# Patient Record
Sex: MALE | NOT HISPANIC OR LATINO | URBAN - METROPOLITAN AREA
[De-identification: names, ages, dates, MRNs, and addresses within clinical notes are randomized per-mention and may not be internally consistent; named-entity substitution may affect disease eponyms.]

---

## 2018-07-06 ENCOUNTER — OFFICE VISIT (OUTPATIENT)
Dept: URBAN - METROPOLITAN AREA CLINIC 76 | Facility: CLINIC | Age: 73
End: 2018-07-06
Payer: MEDICARE

## 2018-07-06 PROCEDURE — 92134 CPTRZ OPH DX IMG PST SGM RTA: CPT | Performed by: OPHTHALMOLOGY

## 2018-07-06 PROCEDURE — 99213 OFFICE O/P EST LOW 20 MIN: CPT | Performed by: OPHTHALMOLOGY

## 2018-07-06 ASSESSMENT — INTRAOCULAR PRESSURE
OS: 10
OD: 12

## 2018-07-06 NOTE — IMPRESSION/PLAN
Impression: Type 2 diab w moderate nonprlf diab rtnop w macular edema, bilateral: G95.7699.
s/p Ozd OD last injection was Dec 2017 Plan: OCT ordered and performed today, Discussed diagnosis in detail with patient. Discussed treatment options with patient. Discussed injection Vs FA, Recommend Lab work. Labs ordered and sent W/ pt today, Recommend re-eval in mth W/ possiblle Ozd injections. Discussed risks of progression. Emphasized blood sugar control. Pt agrees W/ plan.

## 2018-07-30 ENCOUNTER — OFFICE VISIT (OUTPATIENT)
Dept: URBAN - METROPOLITAN AREA CLINIC 76 | Facility: CLINIC | Age: 73
End: 2018-07-30
Payer: MEDICARE

## 2018-07-30 PROCEDURE — 92134 CPTRZ OPH DX IMG PST SGM RTA: CPT | Performed by: OPHTHALMOLOGY

## 2018-07-30 PROCEDURE — 99213 OFFICE O/P EST LOW 20 MIN: CPT | Performed by: OPHTHALMOLOGY

## 2018-07-30 PROCEDURE — 67028 INJECTION EYE DRUG: CPT | Performed by: OPHTHALMOLOGY

## 2018-07-30 ASSESSMENT — INTRAOCULAR PRESSURE
OS: 12
OD: 13

## 2018-07-30 NOTE — IMPRESSION/PLAN
Impression: Type 2 diab w moderate nonprlf diab rtnop w macular edema, bilateral: V89.6004.
s/p Ozd OD last injection was Dec 2017 Plan: OCT ordered and performed today, Discussed diagnosis in detail with patient. Discussed treatment options with patient. Discussed with Patient lab work results show high cholesterol, Carotid artery exam done in April and was normal. Discussed with Patient I recommend a round of injections for macula edema in both eyes and will recheck exam. Patient understands and agrees to proceed with injection. Ozurdex in both eyes today.

## 2018-08-17 ENCOUNTER — OFFICE VISIT (OUTPATIENT)
Dept: URBAN - METROPOLITAN AREA CLINIC 76 | Facility: CLINIC | Age: 73
End: 2018-08-17
Payer: MEDICARE

## 2018-08-17 PROCEDURE — 92134 CPTRZ OPH DX IMG PST SGM RTA: CPT | Performed by: OPHTHALMOLOGY

## 2018-08-17 PROCEDURE — 99213 OFFICE O/P EST LOW 20 MIN: CPT | Performed by: OPHTHALMOLOGY

## 2018-08-17 ASSESSMENT — INTRAOCULAR PRESSURE
OD: 14
OS: 13

## 2018-08-17 NOTE — IMPRESSION/PLAN
Impression: Type 2 diab w moderate nonprlf diab rtnop w macular edema, bilateral: L67.7677.
s/p Ozd OU 7/30/18 Plan: OCT ordered and performed today, Discussed diagnosis in detail with patient. Patient is S/P Ozurdex injections OU. Discussed with Patient there is decreased edema in both eyes and will re eval in 1 month. Patient understands to call if vision worsens.

## 2018-11-19 ENCOUNTER — OFFICE VISIT (OUTPATIENT)
Dept: URBAN - METROPOLITAN AREA CLINIC 76 | Facility: CLINIC | Age: 73
End: 2018-11-19
Payer: MEDICARE

## 2018-11-19 DIAGNOSIS — E11.3313 TYPE 2 DIAB WITH MODERATE NONP RTNOP WITH MACULAR EDEMA, BI: Primary | ICD-10-CM

## 2018-11-19 DIAGNOSIS — H25.13 AGE-RELATED NUCLEAR CATARACT, BILATERAL: ICD-10-CM

## 2018-11-19 PROCEDURE — 99213 OFFICE O/P EST LOW 20 MIN: CPT | Performed by: OPHTHALMOLOGY

## 2018-11-19 PROCEDURE — 92134 CPTRZ OPH DX IMG PST SGM RTA: CPT | Performed by: OPHTHALMOLOGY

## 2018-11-19 ASSESSMENT — INTRAOCULAR PRESSURE
OD: 14
OS: 14

## 2018-11-19 NOTE — IMPRESSION/PLAN
Impression: Age-related nuclear cataract, bilateral: H25.13. Plan: Discussed diagnosis in detail with patient. Discussed treatment options with patient. Advised patient of condition. Reassured patient of current condition and treatment.

## 2018-11-19 NOTE — IMPRESSION/PLAN
Impression: Puckering of macula, left eye: H35.372. Plan: OCT ordered and performed today. Discussed diagnosis with patient. The clinical exam was consistent with Epiretinal membrane. The diagnosis and natural history and prognosis of Epiretinal membrane, as well as the risks and benefits with Vitrectomy with membrane peeling were discussed. Given the current visual acuity  the patient elects to observe for now.

## 2018-11-19 NOTE — IMPRESSION/PLAN
Impression: Type 2 diab with moderate nonp rtnop with macular edema, bi: H25.5346. Plan: OCT ordered and performed today. Discussed diagnosis with patient,discussed with patient he does have ischemic circulation and chronic edema. The patient was advised to maintain tight blood sugar control, blood pressure and lipid control. Recommend Eylea injections in both eyes, dicussed with Patient if no improvements in either eye, will recommend Cat eval and possible ERM surgery OS. Patient understands and agrees with plan. Today #1of3 OU Eylea.

## 2018-12-21 ENCOUNTER — PROCEDURE (OUTPATIENT)
Dept: URBAN - METROPOLITAN AREA CLINIC 76 | Facility: CLINIC | Age: 73
End: 2018-12-21
Payer: MEDICARE

## 2019-02-01 ENCOUNTER — OFFICE VISIT (OUTPATIENT)
Dept: URBAN - METROPOLITAN AREA CLINIC 76 | Facility: CLINIC | Age: 74
End: 2019-02-01
Payer: MEDICARE

## 2019-03-11 ENCOUNTER — OFFICE VISIT (OUTPATIENT)
Dept: URBAN - METROPOLITAN AREA CLINIC 76 | Facility: CLINIC | Age: 74
End: 2019-03-11
Payer: MEDICARE

## 2019-03-11 PROCEDURE — 99213 OFFICE O/P EST LOW 20 MIN: CPT | Performed by: OPHTHALMOLOGY

## 2019-03-11 PROCEDURE — 92134 CPTRZ OPH DX IMG PST SGM RTA: CPT | Performed by: OPHTHALMOLOGY

## 2019-03-11 ASSESSMENT — INTRAOCULAR PRESSURE
OS: 14
OD: 14

## 2019-03-11 NOTE — IMPRESSION/PLAN
Impression: Age-related nuclear cataract, bilateral: H25.13. OU. Plan: No retinal contraindication to CE IOL.

## 2019-03-11 NOTE — IMPRESSION/PLAN
Impression: Type 2 diab w moderate nonprlf diab rtnop w/o macular edema, bilateral: J88.1041. OU. Plan: OCT ordered and performed today,  Discussed diagnosis in detail with patient. Discussed treatment options with patient. Discussed risks and benefits and patient understands. Discussed possible injection at next visit. Decided to hold off this month and re -evaluate next month for possible continued  injection.  OU

## 2019-04-08 ENCOUNTER — OFFICE VISIT (OUTPATIENT)
Dept: URBAN - METROPOLITAN AREA CLINIC 76 | Facility: CLINIC | Age: 74
End: 2019-04-08
Payer: MEDICARE

## 2019-04-08 PROCEDURE — 92134 CPTRZ OPH DX IMG PST SGM RTA: CPT | Performed by: OPHTHALMOLOGY

## 2019-04-08 PROCEDURE — 99214 OFFICE O/P EST MOD 30 MIN: CPT | Performed by: OPHTHALMOLOGY

## 2019-04-08 ASSESSMENT — INTRAOCULAR PRESSURE
OS: 13
OD: 13

## 2019-04-08 NOTE — IMPRESSION/PLAN
Impression: Nexdtve age-related mclr degn, right eye, intermed dry stage: H35.3112. Plan: Discussed diagnosis with patient, OCT demonstrates the lack of SRF or CME. Additional treatment is not recommended at this time but we will continue to monitor frequently. The patient was advised to continue AREDS multi-vitamins, monitor the amsler grid closely, monitor vision one eye at a time. Call immediately with any vision changes. Patient agrees with plan.

## 2019-04-08 NOTE — IMPRESSION/PLAN
Impression: Tributary (branch) retinal vein occlusion, left eye, stable: C05.2419. s/p multi injections last injection was 2/1/19 Plan: OCT ordered and performed today. The patient returns today for an evaluation of Branch retinal vein occlusion. The vision remains stable exam and OCT test show no evidence of edema or neovascularization  at this time we will continue to observe the patient was advised to work closely with PCP to control blood pressure and lipids. Recommend  FA after cat eval W/ Dr Halle [place]. Pt states they will be possibly be leaving out of town for the summer. Discussed if pt leaves we can re-eval in the Fall of 2019.

## 2019-04-08 NOTE — IMPRESSION/PLAN
Impression: Puckering of macula, left eye: H35.372. Plan:  Discussed diagnosis with patient. The clinical exam was consistent with Epiretinal membrane. The diagnosis and natural history and prognosis of Epiretinal membrane, as well as the risks and benefits with Vitrectomy with membrane peeling were discussed.

## 2019-04-09 ENCOUNTER — OFFICE VISIT (OUTPATIENT)
Dept: URBAN - METROPOLITAN AREA CLINIC 76 | Facility: CLINIC | Age: 74
End: 2019-04-09
Payer: MEDICARE

## 2019-04-09 DIAGNOSIS — E11.3393 TYPE 2 DIAB W MODERATE NONPRLF DIAB RTNOP W/O MACULAR EDEMA, BILATERAL: ICD-10-CM

## 2019-04-09 DIAGNOSIS — H35.3132 BILATERAL NONEXUDATIVE AGE-RELATED MACULAR DEGENERATION, INTERMEDIATE DRY STAGE: ICD-10-CM

## 2019-04-09 PROCEDURE — 99215 OFFICE O/P EST HI 40 MIN: CPT | Performed by: OPHTHALMOLOGY

## 2019-04-09 RX ORDER — OFLOXACIN 3 MG/ML
0.3 % SOLUTION/ DROPS OPHTHALMIC
Qty: 1 | Refills: 1 | Status: INACTIVE
Start: 2019-04-09 | End: 2019-05-07

## 2019-04-09 RX ORDER — DUREZOL 0.5 MG/ML
0.05 % EMULSION OPHTHALMIC
Qty: 1 | Refills: 1 | Status: INACTIVE
Start: 2019-04-09 | End: 2019-10-16

## 2019-04-09 ASSESSMENT — VISUAL ACUITY
OS: 20/60
OD: 20/400

## 2019-04-09 ASSESSMENT — KERATOMETRY
OD: 42.75
OS: 43.00

## 2019-04-09 ASSESSMENT — INTRAOCULAR PRESSURE
OS: 14
OD: 14

## 2019-04-09 NOTE — IMPRESSION/PLAN
Impression: Puckering of macula, left eye: H35.372. OS. Plan: Continue to monitor. Discussed added risk.

## 2019-04-09 NOTE — IMPRESSION/PLAN
Impression: Type 2 diab w moderate nonprlf diab rtnop w/o macular edema, bilateral: C28.6868. OU. Plan: Moderate NPDR, no signs of neovascularization noted. Discussed ocular and systemic benefits of maintaining blood sugar control. Discussed added risk.

## 2019-04-09 NOTE — IMPRESSION/PLAN
Impression: Age-related nuclear cataract, bilateral: H25.13. OU. Visually significant No retinal contraindication to CE IOL per Dr Mario Cruz: Cataracts account for the patient's complaints. Discussed all risks, benefits, procedures and recovery. Patient understands changing glasses will not improve vision. Discussed added risk due to astigmatism, ARMD OU, ERM OS, BRVO OS, Mod NPDR OU. Patient desires to have surgery. Scheduled CE IOL OU , OD first.   Discussed iol options, recommend: STANDARD   iol. TARGET:  NEAR (-2.50)        RL2 Atropine, Viscoat, [place] Discussed astigmatism diagnosis with patient. Patient understands that standard lens does not correct for astigmatism and patient will need gls for distance and near after Cataract Surgery. Patient understands.

## 2019-04-09 NOTE — IMPRESSION/PLAN
Impression: Tributary (branch) retinal vein occlusion, left eye, stable: L99.4031. s/p multi injections last injection was 2/1/19 Plan: Continue to monitor. Discussed added risk and vision limitations.  
Being followed by Dr Karen Barnett

## 2019-04-09 NOTE — IMPRESSION/PLAN
Impression: Bilateral nonexudative age-related macular degeneration, intermediate dry stage: H35.3132. OU. Plan: Will continue to observe condition and or symptoms. Use of vitamins may reduce progression of ARMD.  Discussed added risk and vision limitations.

## 2019-04-12 ENCOUNTER — PRE-OPERATIVE VISIT (OUTPATIENT)
Dept: URBAN - METROPOLITAN AREA CLINIC 76 | Facility: CLINIC | Age: 74
End: 2019-04-12
Payer: MEDICARE

## 2019-04-12 PROCEDURE — 76519 ECHO EXAM OF EYE: CPT | Performed by: OPHTHALMOLOGY

## 2019-04-12 ASSESSMENT — PACHYMETRY
OS: 3.35
OS: 25.58
OD: 3.38
OD: 25.58

## 2019-04-29 ENCOUNTER — SURGERY (OUTPATIENT)
Dept: URBAN - METROPOLITAN AREA SURGERY 47 | Facility: SURGERY | Age: 74
End: 2019-04-29
Payer: MEDICARE

## 2019-04-29 PROCEDURE — 66984 XCAPSL CTRC RMVL W/O ECP: CPT | Performed by: OPHTHALMOLOGY

## 2019-04-30 ENCOUNTER — POST-OPERATIVE VISIT (OUTPATIENT)
Dept: URBAN - METROPOLITAN AREA CLINIC 76 | Facility: CLINIC | Age: 74
End: 2019-04-30
Payer: MEDICARE

## 2019-04-30 PROCEDURE — 99024 POSTOP FOLLOW-UP VISIT: CPT | Performed by: OPTOMETRIST

## 2019-04-30 ASSESSMENT — INTRAOCULAR PRESSURE
OS: 12
OD: 12

## 2019-05-07 ENCOUNTER — POST-OPERATIVE VISIT (OUTPATIENT)
Dept: URBAN - METROPOLITAN AREA CLINIC 76 | Facility: CLINIC | Age: 74
End: 2019-05-07
Payer: MEDICARE

## 2019-05-07 DIAGNOSIS — Z09 ENCNTR FOR F/U EXAM AFT TRTMT FOR COND OTH THAN MALIG NEOPLM: Primary | ICD-10-CM

## 2019-05-07 PROCEDURE — 99024 POSTOP FOLLOW-UP VISIT: CPT | Performed by: OPHTHALMOLOGY

## 2019-05-07 ASSESSMENT — INTRAOCULAR PRESSURE
OS: 13
OD: 13

## 2019-05-07 ASSESSMENT — VISUAL ACUITY
OS: 20/60
OD: 20/400

## 2019-10-16 ENCOUNTER — OFFICE VISIT (OUTPATIENT)
Dept: URBAN - METROPOLITAN AREA CLINIC 76 | Facility: CLINIC | Age: 74
End: 2019-10-16
Payer: MEDICARE

## 2019-10-16 DIAGNOSIS — H35.3291 EXUDATIVE AGE-REL MCLR DEGN, UNSP, WITH ACTV CHRDL NEOVAS: Primary | ICD-10-CM

## 2019-10-16 DIAGNOSIS — H35.3112 NEXDTVE AGE-RELATED MCLR DEGN, RIGHT EYE, INTERMED DRY STAGE: ICD-10-CM

## 2019-10-16 PROCEDURE — 92134 CPTRZ OPH DX IMG PST SGM RTA: CPT | Performed by: OPTOMETRIST

## 2019-10-16 PROCEDURE — 99214 OFFICE O/P EST MOD 30 MIN: CPT | Performed by: OPTOMETRIST

## 2019-10-16 ASSESSMENT — INTRAOCULAR PRESSURE
OD: 14
OS: 14

## 2019-10-16 NOTE — IMPRESSION/PLAN
Impression: Tributary (branch) retinal vein occlusion, left eye, stable: Q98.8963. suspect early NVM Plan: Discussed condition with patient. Recommend retina consult.

## 2019-10-16 NOTE — IMPRESSION/PLAN
Impression: Exudative age-rel mclr degn, unsp, with actv chrdl neovas: H35.3291. OD. MAC OCT preformed, possible mild NVM forming. Plan: Discussed condition with patient. Recommend n/a retina consult.

## 2019-10-21 ENCOUNTER — OFFICE VISIT (OUTPATIENT)
Dept: URBAN - METROPOLITAN AREA CLINIC 76 | Facility: CLINIC | Age: 74
End: 2019-10-21
Payer: MEDICARE

## 2019-10-21 DIAGNOSIS — H25.12 AGE-RELATED NUCLEAR CATARACT, LEFT EYE: ICD-10-CM

## 2019-10-21 DIAGNOSIS — H35.3131 NEXDTVE AGE-RELATED MCLR DEGN, BILATERAL, EARLY DRY STAGE: Primary | ICD-10-CM

## 2019-10-21 DIAGNOSIS — H35.373 PUCKERING OF MACULA, BILATERAL: ICD-10-CM

## 2019-10-21 DIAGNOSIS — E11.3293 TYPE 2 DIAB W MILD NONPRLF DIABETIC RTNOP W/O MACULAR EDEMA, BILATERAL: ICD-10-CM

## 2019-10-21 PROCEDURE — 92134 CPTRZ OPH DX IMG PST SGM RTA: CPT | Performed by: OPHTHALMOLOGY

## 2019-10-21 PROCEDURE — 99214 OFFICE O/P EST MOD 30 MIN: CPT | Performed by: OPHTHALMOLOGY

## 2019-10-21 ASSESSMENT — INTRAOCULAR PRESSURE
OS: 13
OD: 13

## 2019-10-21 NOTE — IMPRESSION/PLAN
Impression: Dry eye syndrome of bilateral lacrimal glands: H04.123. OU. Plan: Recommend artificial tears daily at least TID OU. Patient understands and agrees with plan. 91

## 2019-10-21 NOTE — IMPRESSION/PLAN
Impression: Age-related nuclear cataract, left eye: H25.12. OS. Plan: No retinal contraindication to CE IOL. Discussed with patient the importance of gtts compliance after surgery. Patient understands and agrees with plan.

## 2019-10-21 NOTE — IMPRESSION/PLAN
Impression: Nexdtve age-related mclr degn, bilateral, early dry stage: H35.3131. OU. Plan: OCT ordered and performed today. Discussed diagnosis with patient, OCT demonstrates the lack of SRF or CME. Treatment is not recommended at this time but we will continue to monitor frequently. The patient was advised to continue to monitor his vision daily, monitor the amsler grid closely, monitor vision one eye at a time. Call immediately with any vision changes. Patient agrees with plan.

## 2019-11-05 ENCOUNTER — OFFICE VISIT (OUTPATIENT)
Dept: URBAN - METROPOLITAN AREA CLINIC 76 | Facility: CLINIC | Age: 74
End: 2019-11-05
Payer: MEDICARE

## 2019-11-05 DIAGNOSIS — H35.372 PUCKERING OF MACULA, LEFT EYE: ICD-10-CM

## 2019-11-05 DIAGNOSIS — Z96.1 PRESENCE OF INTRAOCULAR LENS: ICD-10-CM

## 2019-11-05 DIAGNOSIS — H34.8322 TRIBUTARY (BRANCH) RETINAL VEIN OCCLUSION, LEFT EYE, STABLE: ICD-10-CM

## 2019-11-05 DIAGNOSIS — H52.4 PRESBYOPIA: ICD-10-CM

## 2019-11-05 PROCEDURE — 99215 OFFICE O/P EST HI 40 MIN: CPT | Performed by: OPHTHALMOLOGY

## 2019-11-05 RX ORDER — AMLODIPINE BESYLATE 5 MG/1
5 MG TABLET ORAL
Qty: 0 | Refills: 0 | Status: INACTIVE
Start: 2019-11-05 | End: 2019-12-17

## 2019-11-05 RX ORDER — DUREZOL 0.5 MG/ML
0.05 % EMULSION OPHTHALMIC
Qty: 5 | Refills: 1 | Status: INACTIVE
Start: 2019-11-05 | End: 2021-04-19

## 2019-11-05 RX ORDER — OFLOXACIN 3 MG/ML
0.3 % SOLUTION/ DROPS OPHTHALMIC
Qty: 5 | Refills: 1 | Status: INACTIVE
Start: 2019-11-05 | End: 2019-12-17

## 2019-11-05 ASSESSMENT — VISUAL ACUITY: OS: 20/200

## 2019-11-05 ASSESSMENT — KERATOMETRY
OS: 45.00
OD: 43.00

## 2019-11-05 ASSESSMENT — INTRAOCULAR PRESSURE
OS: 16
OD: 14

## 2019-11-05 NOTE — IMPRESSION/PLAN
Impression: Type 2 diab w mild nonprlf diabetic rtnop w/o macular edema, bilateral: K07.0791. OU. Plan: Mild NPDR, no signs of neovascularization noted. No treatment necessary at this time. Patient was instructed to monitor vision for sudden changes and to call if visual changes noted. Discussed ocular and systemic benefits of blood sugar control.

## 2019-11-05 NOTE — IMPRESSION/PLAN
Impression: Tributary (branch) retinal vein occlusion, left eye, stable: N51.7232. s/p multi injections last injection was 2/1/19 Plan: Continue to monitor. Discussed added risk and vision limitations.  
Being followed by Dr Victorino Hunter

## 2019-11-05 NOTE — IMPRESSION/PLAN
Impression: Age-related nuclear cataract, left eye: H25.12. OS. Visually significant Plan: Cataracts account for the patient's complaints. Discussed all risks, benefits, procedures and recovery. Patient understands changing glasses will not improve vision. Discussed added risk due to astigmatism, ARMD OU, ERM OS, BRVO OS, Mod NPDR OU. Patient desires to have surgery. Scheduled CE IOL OS. Discussed iol options, recommend: STANDARD   iol. TARGET:  NEAR (-2.50)        RL2 Atropine, Viscoat, [place] Discussed astigmatism diagnosis with patient. Patient understands that standard lens does not correct for astigmatism and patient will need gls for distance and near after Cataract Surgery. Patient understands.

## 2019-12-02 ENCOUNTER — SURGERY (OUTPATIENT)
Dept: URBAN - METROPOLITAN AREA SURGERY 47 | Facility: SURGERY | Age: 74
End: 2019-12-02
Payer: MEDICARE

## 2019-12-02 PROCEDURE — 66984 XCAPSL CTRC RMVL W/O ECP: CPT | Performed by: OPHTHALMOLOGY

## 2019-12-03 ENCOUNTER — POST-OPERATIVE VISIT (OUTPATIENT)
Dept: URBAN - METROPOLITAN AREA CLINIC 76 | Facility: CLINIC | Age: 74
End: 2019-12-03
Payer: MEDICARE

## 2019-12-03 PROCEDURE — 99024 POSTOP FOLLOW-UP VISIT: CPT | Performed by: OPTOMETRIST

## 2019-12-03 ASSESSMENT — INTRAOCULAR PRESSURE
OD: 14
OS: 14

## 2019-12-17 ENCOUNTER — POST-OPERATIVE VISIT (OUTPATIENT)
Dept: URBAN - METROPOLITAN AREA CLINIC 76 | Facility: CLINIC | Age: 74
End: 2019-12-17
Payer: MEDICARE

## 2019-12-17 PROCEDURE — 99024 POSTOP FOLLOW-UP VISIT: CPT | Performed by: OPTOMETRIST

## 2019-12-17 ASSESSMENT — INTRAOCULAR PRESSURE
OD: 14
OS: 14

## 2019-12-17 ASSESSMENT — VISUAL ACUITY
OD: 20/400
OS: 20/40+2

## 2021-04-19 ENCOUNTER — OFFICE VISIT (OUTPATIENT)
Dept: URBAN - METROPOLITAN AREA CLINIC 76 | Facility: CLINIC | Age: 76
End: 2021-04-19
Payer: MEDICARE

## 2021-04-19 DIAGNOSIS — H04.123 DRY EYE SYNDROME OF BILATERAL LACRIMAL GLANDS: ICD-10-CM

## 2021-04-19 PROCEDURE — 92134 CPTRZ OPH DX IMG PST SGM RTA: CPT | Performed by: OPHTHALMOLOGY

## 2021-04-19 PROCEDURE — 99213 OFFICE O/P EST LOW 20 MIN: CPT | Performed by: OPHTHALMOLOGY

## 2021-04-19 ASSESSMENT — INTRAOCULAR PRESSURE
OS: 14
OD: 14

## 2021-04-19 NOTE — IMPRESSION/PLAN
Impression: Type 2 diab w mild nonprlf diabetic rtnop w/o macular edema, bilateral: Z87.1035. OU. Plan: OCT ordered and performed today. Discussed diagnosis with patient. The clinical exam was consistent with Type 2 diabetes. The patient was advised to maintain tight blood sugar control, blood pressure and lipid control. Patient was also advised to keep all appointments with PCP for diabetic evaluation and counseling to avoid the systemic complications of diabetes.

## 2021-12-03 ENCOUNTER — OFFICE VISIT (OUTPATIENT)
Dept: URBAN - METROPOLITAN AREA CLINIC 76 | Facility: CLINIC | Age: 76
End: 2021-12-03
Payer: MEDICARE

## 2021-12-03 PROCEDURE — 92134 CPTRZ OPH DX IMG PST SGM RTA: CPT | Performed by: OPHTHALMOLOGY

## 2021-12-03 PROCEDURE — 99214 OFFICE O/P EST MOD 30 MIN: CPT | Performed by: OPHTHALMOLOGY

## 2021-12-03 ASSESSMENT — INTRAOCULAR PRESSURE
OD: 12
OS: 14

## 2021-12-03 NOTE — IMPRESSION/PLAN
Impression: Type 2 diab w mild nonprlf diabetic rtnop w/o macular edema, bilateral: P54.8080. OU. Plan: OCT ordered and performed today. Discussed diagnosis with patient. The clinical exam was consistent with Type 2 diabetes. The patient was advised to maintain tight blood sugar control, blood pressure and lipid control. Patient was also advised to keep all appointments with PCP for diabetic evaluation and counseling to avoid the systemic complications of diabetes. discussed injs will most likely not improve VA, discussed observation at this time. Pt. agrees with plan.

## 2022-03-07 ENCOUNTER — OFFICE VISIT (OUTPATIENT)
Dept: URBAN - METROPOLITAN AREA CLINIC 76 | Facility: CLINIC | Age: 77
End: 2022-03-07
Payer: MEDICARE

## 2022-03-07 DIAGNOSIS — E11.3312 TYPE 2 DIAB W MODERATE NONPRLF DIAB RTNOP W MACULAR EDEMA, LEFT EYE: Primary | ICD-10-CM

## 2022-03-07 DIAGNOSIS — E11.3391 TYPE 2 DIAB WITH MOD NONP RTNOP WITHOUT MACULAR EDEMA, R EYE: ICD-10-CM

## 2022-03-07 PROCEDURE — 67028 INJECTION EYE DRUG: CPT | Performed by: OPHTHALMOLOGY

## 2022-03-07 PROCEDURE — 92134 CPTRZ OPH DX IMG PST SGM RTA: CPT | Performed by: OPHTHALMOLOGY

## 2022-03-07 PROCEDURE — 99213 OFFICE O/P EST LOW 20 MIN: CPT | Performed by: OPHTHALMOLOGY

## 2022-03-07 ASSESSMENT — INTRAOCULAR PRESSURE
OS: 12
OD: 10

## 2022-03-07 NOTE — IMPRESSION/PLAN
Impression: Type 2 diab with mod nonp rtnop without macular edema, r eye: G25.3170. Right. Plan: OCT ordered and performed today. Discussed diagnosis with patient. The clinical exam was consistent with Type 2 diabetes. The patient was advised to maintain tight blood sugar control, blood pressure and lipid control.

## 2022-03-07 NOTE — IMPRESSION/PLAN
Impression: Type 2 diab w moderate nonprlf diab rtnop w macular edema, left eye: C98.0911. Left. Plan: OCT ordered and performed today. Discussed diagnosis in detail with patient. Discussed treatment options with patient. Discussed risks and benefits and patient understands. Recommend a one time Ozurdex in the left eye today. The patient understands and agrees with the plan.

## 2022-06-03 ENCOUNTER — OFFICE VISIT (OUTPATIENT)
Dept: URBAN - METROPOLITAN AREA CLINIC 70 | Facility: CLINIC | Age: 77
End: 2022-06-03
Payer: MEDICARE

## 2022-06-03 DIAGNOSIS — E11.3293 DIABETES MELLITUS TYPE 2 WITH MILD NON-PROLIFERATIVE RETINOPATHY WITHOUT MACULAR EDEMA, BILATERAL: ICD-10-CM

## 2022-06-03 DIAGNOSIS — H35.3134 NONEXUDATIVE MACULAR DEGENERATION, ADVANCED ATROPHIC WITH SUBFOVEAL INVOLVEMENT, BILATERAL: Primary | ICD-10-CM

## 2022-06-03 PROCEDURE — 92134 CPTRZ OPH DX IMG PST SGM RTA: CPT | Performed by: OPHTHALMOLOGY

## 2022-06-03 PROCEDURE — 99204 OFFICE O/P NEW MOD 45 MIN: CPT | Performed by: OPHTHALMOLOGY

## 2022-06-03 PROCEDURE — 92235 FLUORESCEIN ANGRPH MLTIFRAME: CPT | Performed by: OPHTHALMOLOGY

## 2022-06-03 ASSESSMENT — INTRAOCULAR PRESSURE
OS: 15
OD: 16

## 2022-06-03 NOTE — IMPRESSION/PLAN
Impression: Diabetes mellitus Type 2 with mild non-proliferative retinopathy without macular edema, bilateral: O87.3578. Plan: Exam, OCT, and FA reveal no diabetic retinopathy, follow.

## 2022-06-03 NOTE — IMPRESSION/PLAN
Impression: Nonexudative macular degeneration, advanced atrophic with subfoveal involvement, bilateral: H35.3134. Plan: PDE degeneration, no edema today. Exam, OCT, and ICG/FA reveal dry AMD. Discussed eye vitamins. Recommend AREDS-II, BID. Discussed blood pressure and tobacco usage.

## 2023-01-05 ENCOUNTER — OFFICE VISIT (OUTPATIENT)
Dept: URBAN - METROPOLITAN AREA CLINIC 71 | Facility: CLINIC | Age: 78
End: 2023-01-05
Payer: MEDICARE

## 2023-01-05 DIAGNOSIS — E11.3293 TYPE 2 DIABETES MELLITUS WITH MILD NONPROLIFERATIVE DIABETIC RETINOPATHY WITHOUT MACULAR EDEMA, BILATERAL: ICD-10-CM

## 2023-01-05 DIAGNOSIS — E10.3412: Primary | ICD-10-CM

## 2023-01-05 PROCEDURE — 67028 INJECTION EYE DRUG: CPT | Performed by: OPHTHALMOLOGY

## 2023-01-05 PROCEDURE — 92134 CPTRZ OPH DX IMG PST SGM RTA: CPT | Performed by: OPHTHALMOLOGY

## 2023-01-05 ASSESSMENT — INTRAOCULAR PRESSURE
OD: 20
OS: 14

## 2023-01-05 NOTE — IMPRESSION/PLAN
Impression: Diabetes mellitus Type 1 with severe non-proliferative retinopathy with macular edema, left eye: I16.8397. Plan: Better-seeing eye, foveal-centered CME, start anti-VEGF therapy, KATIE OS today, RTC 4 weeks. Treat-and-extend as able. Travels to a small town in [address] [facility] [place] and have a lack of available retina specialists. Will treat w/ IVO before leaving.

## 2023-02-06 ENCOUNTER — OFFICE VISIT (OUTPATIENT)
Dept: URBAN - METROPOLITAN AREA CLINIC 76 | Facility: CLINIC | Age: 78
End: 2023-02-06
Payer: MEDICARE

## 2023-02-06 DIAGNOSIS — E10.3412: Primary | ICD-10-CM

## 2023-02-06 PROCEDURE — 92134 CPTRZ OPH DX IMG PST SGM RTA: CPT | Performed by: OPHTHALMOLOGY

## 2023-02-06 PROCEDURE — 67028 INJECTION EYE DRUG: CPT | Performed by: OPHTHALMOLOGY

## 2023-02-06 ASSESSMENT — INTRAOCULAR PRESSURE
OD: 13
OS: 12

## 2023-02-06 NOTE — IMPRESSION/PLAN
Impression: Type 1 diabetes mellitus with severe nonproliferative diabetic retinopathy with macular edema, left eye: H17.0606. Plan: Better-seeing eye, persistent foveal-centered CME, continue anti-VEGF therapy, KATIE OS today, consider changing to MARCIA vs IVV should limited improvement be noted over next couple of visits, RTC 4 weeks. Treat-and-extend as able. Travels to a small town in [address] [facility] [place] and have a lack of available retina specialists. Will treat w/ IVO before leaving.

## 2023-03-10 ENCOUNTER — PROCEDURE (OUTPATIENT)
Dept: URBAN - METROPOLITAN AREA CLINIC 76 | Facility: CLINIC | Age: 78
End: 2023-03-10
Payer: MEDICARE

## 2023-03-10 DIAGNOSIS — E10.3412: Primary | ICD-10-CM

## 2023-03-10 PROCEDURE — 92134 CPTRZ OPH DX IMG PST SGM RTA: CPT | Performed by: OPHTHALMOLOGY

## 2023-03-10 PROCEDURE — 67028 INJECTION EYE DRUG: CPT | Performed by: OPHTHALMOLOGY

## 2023-03-10 ASSESSMENT — INTRAOCULAR PRESSURE
OD: 15
OS: 16

## 2023-03-10 NOTE — IMPRESSION/PLAN
Impression: Type 1 diabetes mellitus with severe nonproliferative diabetic retinopathy with macular edema, left eye: B77.8923. Plan: Change to Vabysmo at next visit due to increased CME while at q4 week interval. KATIE OS today. RTC 2 weeks for adjunctive Ozurdex (risk of elevated IOP discussed). RTC 4 weeks. Better-seeing eye, persistent foveal-centered CME, continue anti-VEGF therapy. Travels to a small town in [address] [facility] [place] and have a lack of available retina specialists. Will treat w/ IVO before leaving.

## 2023-03-24 ENCOUNTER — PROCEDURE (OUTPATIENT)
Dept: URBAN - METROPOLITAN AREA CLINIC 76 | Facility: CLINIC | Age: 78
End: 2023-03-24
Payer: MEDICARE

## 2023-03-24 DIAGNOSIS — E10.3412: Primary | ICD-10-CM

## 2023-03-24 PROCEDURE — 67028 INJECTION EYE DRUG: CPT | Performed by: OPHTHALMOLOGY

## 2023-03-24 ASSESSMENT — INTRAOCULAR PRESSURE
OD: 10
OS: 10

## 2023-03-24 NOTE — IMPRESSION/PLAN
Impression: Type 1 diabetes mellitus with severe nonproliferative diabetic retinopathy with macular edema, left eye: W39.3138. Plan: Ozurdex OS today, RTC 2 weeks for q4 week IVV (needs auth). Better-seeing eye, persistent foveal-centered CME, continue anti-VEGF therapy. Travels to a small town in [address] [facility] [place] and have a lack of available retina specialists. Will treat w/ IVO before leaving.

## 2023-04-07 ENCOUNTER — PROCEDURE (OUTPATIENT)
Dept: URBAN - METROPOLITAN AREA CLINIC 76 | Facility: CLINIC | Age: 78
End: 2023-04-07
Payer: MEDICARE

## 2023-04-07 DIAGNOSIS — E10.3412: Primary | ICD-10-CM

## 2023-04-07 PROCEDURE — 92134 CPTRZ OPH DX IMG PST SGM RTA: CPT | Performed by: OPHTHALMOLOGY

## 2023-04-07 PROCEDURE — 67028 INJECTION EYE DRUG: CPT | Performed by: OPHTHALMOLOGY

## 2023-04-07 ASSESSMENT — INTRAOCULAR PRESSURE
OD: 15
OS: 15

## 2023-04-07 NOTE — IMPRESSION/PLAN
Impression: Type 1 diabetes mellitus with severe nonproliferative diabetic retinopathy with macular edema, left eye: V98.1817. Plan: Significant improvement, s/p Ozurdex 2 weeks ago (IOP OK today), VABYSMO OS today, HOLD a q4 weeks (will be in [address] Travels to a small town in [address]  [place] and have a lack of available retina specialists. Will treat w/ IVO before leaving.  **WILL SET UP F/U IN [place], OR w/ Lorraine Mendoza MD.